# Patient Record
Sex: MALE | Race: WHITE | Employment: OTHER | URBAN - METROPOLITAN AREA
[De-identification: names, ages, dates, MRNs, and addresses within clinical notes are randomized per-mention and may not be internally consistent; named-entity substitution may affect disease eponyms.]

---

## 2023-11-15 ENCOUNTER — HOSPITAL ENCOUNTER (OUTPATIENT)
Dept: NUCLEAR MEDICINE | Age: 73
Discharge: HOME | End: 2023-11-15
Payer: COMMERCIAL

## 2023-11-15 DIAGNOSIS — T85.01XA: ICD-10-CM

## 2023-11-15 DIAGNOSIS — G91.2 (IDIOPATHIC) NORMAL PRESSURE HYDROCEPHALUS (HCC): ICD-10-CM

## 2023-11-15 PROCEDURE — 78645 CSF SHUNT EVALUATION: CPT

## 2023-11-15 PROCEDURE — A9548 IN111 PENTETATE: HCPCS | Performed by: STUDENT IN AN ORGANIZED HEALTH CARE EDUCATION/TRAINING PROGRAM

## 2023-11-15 PROCEDURE — 3430000000 HC RX DIAGNOSTIC RADIOPHARMACEUTICAL: Performed by: STUDENT IN AN ORGANIZED HEALTH CARE EDUCATION/TRAINING PROGRAM

## 2023-11-15 RX ORDER — INDIUM IN-111 PENTETATE DISODIUM 1 MCI/ML
503 SOLUTION INTRATHECAL
Status: COMPLETED | OUTPATIENT
Start: 2023-11-15 | End: 2023-11-15

## 2023-11-15 RX ADMIN — INDIUM IN-111 PENTETATE DISODIUM 503 MICRO CURIE: 1 SOLUTION INTRATHECAL at 12:51

## 2023-11-15 NOTE — PROCEDURES
Neurosurgery Procedure Note     Name of Procedure:  Shuntogram     Date Performed: 11/15/2023     Procedure performed by: Freddie Fraga     Indications: Worsening Gait Instability and Confusion     Consent: Consent obtained verbally. Procedure Technique: Patient and procedure verified. Patient and procedure verified. The patient was draped and site prepped. Nurse Practitioner performing the procedure adhered to universal precaution protocol and used mask,sterile gown and sterile gloves. Procedure was performed in sterile manner. The 25 gauge needle was inserted through the reservoir of the ventricular peritoneal shunt system without difficulty. CSF flowed easily and 1 mL of CSF The previously prepared sterile isotope solution was injected into the reservoir and syringe tubing was flushed with sterile no preservative saline. Syringe, tubing and needle were properly disposed into radioactive waste container. Patient tolerated procedure well.      Estimated Procedure Blood Loss: None     Specimen(s) Removed: 1 mL     Medications used during procedure: None     Post-Operative Diagnosis: NPH

## 2023-11-16 ENCOUNTER — HOSPITAL ENCOUNTER (OUTPATIENT)
Dept: NUCLEAR MEDICINE | Age: 73
Discharge: HOME | End: 2023-11-16
Payer: COMMERCIAL

## 2024-01-08 ENCOUNTER — HOSPITAL ENCOUNTER (OUTPATIENT)
Dept: NUCLEAR MEDICINE | Age: 74
Discharge: HOME | End: 2024-01-08

## 2024-01-08 DIAGNOSIS — T85.01XA: ICD-10-CM

## 2024-01-11 ENCOUNTER — HOSPITAL ENCOUNTER (OUTPATIENT)
Dept: NUCLEAR MEDICINE | Age: 74
Discharge: HOME | End: 2024-01-11
Payer: COMMERCIAL

## 2024-01-11 ENCOUNTER — APPOINTMENT (OUTPATIENT)
Dept: NUCLEAR MEDICINE | Age: 74
End: 2024-01-11
Payer: COMMERCIAL

## 2024-01-11 PROCEDURE — 78645 CSF SHUNT EVALUATION: CPT

## 2024-01-11 PROCEDURE — 3430000000 HC RX DIAGNOSTIC RADIOPHARMACEUTICAL: Performed by: PHYSICIAN ASSISTANT

## 2024-01-11 PROCEDURE — A9548 IN111 PENTETATE: HCPCS | Performed by: PHYSICIAN ASSISTANT

## 2024-01-11 RX ORDER — INDIUM IN-111 PENTETATE DISODIUM 1 MCI/ML
0.59 SOLUTION INTRATHECAL
Status: COMPLETED | OUTPATIENT
Start: 2024-01-11 | End: 2024-01-11

## 2024-01-11 RX ADMIN — INDIUM IN-111 PENTETATE DISODIUM 0.59 MICRO CURIE: 1 SOLUTION INTRATHECAL at 11:15

## 2024-01-11 NOTE — PROCEDURES
Neurosurgery Procedure Note     Name of Procedure: Shuntogram      Date Performed: 1/11/2024     Procedure performed by: CARLOTA Ariza-CNP     Indications: shunt malfunction      Consent: Consent obtained verbally.      Procedure Technique: Patient and procedure verified. Patient and procedure verified. The patient was draped and site prepped. Nurse Practitioner performing the procedure adhered to universal precaution protocol and used mask,sterile gown and sterile gloves. Procedure was performed in sterile manner.     The 25 gauge needle was inserted through the reservoir of the ventricular peritoneal shunt system without difficulty. CSF flowed easily and 1mL of CSF was withdrawn. The previously prepared sterile isotope solution was injected into the reservoir and syringe tubing was flushed with sterile no preservative saline. Syringe, tubing and needle were properly disposed into radioactive waste container. Patient tolerated procedure well.     Estimated Procedure Blood Loss: None     Specimen(s) Removed: none     Medications used during procedure: None     Post-Operative Diagnosis: shunt malfunction

## 2024-01-12 ENCOUNTER — APPOINTMENT (OUTPATIENT)
Dept: NUCLEAR MEDICINE | Age: 74
End: 2024-01-12
Payer: COMMERCIAL

## 2024-01-12 ENCOUNTER — HOSPITAL ENCOUNTER (OUTPATIENT)
Dept: NUCLEAR MEDICINE | Age: 74
Discharge: HOME | End: 2024-01-12
Payer: COMMERCIAL

## 2024-05-02 LAB — CREAT SERPL-MCNC: 1 MG/DL (ref 0.7–1.3)
